# Patient Record
Sex: FEMALE | Race: WHITE
[De-identification: names, ages, dates, MRNs, and addresses within clinical notes are randomized per-mention and may not be internally consistent; named-entity substitution may affect disease eponyms.]

---

## 2017-06-01 ENCOUNTER — HOSPITAL ENCOUNTER (OUTPATIENT)
Dept: HOSPITAL 63 - DXRADRC | Age: 22
Discharge: HOME | End: 2017-06-01
Attending: PHYSICIAN ASSISTANT
Payer: COMMERCIAL

## 2017-06-01 DIAGNOSIS — R07.89: Primary | ICD-10-CM

## 2017-06-01 PROCEDURE — 71101 X-RAY EXAM UNILAT RIBS/CHEST: CPT

## 2017-06-01 NOTE — RAD
Indication chronic left-sided chest pain.



A frontal view of the chest was obtained as well as films targeted to left

ribs. The chest is compared to an examination January 4, 2016.



The heart and pulmonary vessels appear normal. The mediastinum has a normal

appearance. There is no focal consolidated pneumonia. No pleural fluid or

pneumothorax is seen.



Films targeted left ribs appear unremarkable.



IMPRESSION: No acute or significant finding in the chest.

                          Normal plain films left ribs.

## 2018-06-05 ENCOUNTER — HOSPITAL ENCOUNTER (OUTPATIENT)
Dept: HOSPITAL 63 - US | Age: 23
Discharge: HOME | End: 2018-06-05
Payer: COMMERCIAL

## 2018-06-05 DIAGNOSIS — Z3A.19: ICD-10-CM

## 2018-06-05 DIAGNOSIS — O26.842: ICD-10-CM

## 2018-06-05 PROCEDURE — 76805 OB US >/= 14 WKS SNGL FETUS: CPT

## 2018-06-05 NOTE — RAD
EXAM: Obstetric ultrasound.

 

HISTORY: Fetal anatomy survey.

 

COMPARISON: None.

 

FINDINGS: Sonographic evaluation of the pelvis and fetus was performed 

transabdominally.

 

There is a single fetus in breech presentation. Fetal heart rate is 139 

bpm. Estimated gestational age based on measurements is 19 weeks 5 days. 

Estimated fetal weight is 310 g, at 49th percentile. Individual 

measurements are commensurate.

 

The placenta is anterior. Amniotic fluid volume is normal. The cervix is 

closed and measures 4.4 cm.

 

The choroid plexus and lateral ventricles are unremarkable. The posterior 

fossa appears normal. Images of the spine reveal no clear defects. The 

stomach and bladder are visualized. Images of the kidneys reveal no 

hydronephrosis. Fetal extremities appear normal. The cord is three-vessel.

Facial and lip/nose morphology appear normal. The heart is four-chamber on

real-time scanning, but this is not well demonstrated on these images. The

cord insertion appears normal.

 

The left ovary measures 3.4 x 2.7 x 2.2 cm. The right measures 3.0 x 2.5 x

1.7 cm. Both are unremarkable.

 

IMPRESSION: 

1. Single fetus in breech presentation. Estimated gestational age based on

measurements 19 weeks 5 days. Fetal heart rate 139 bpm.

2. The heart is four-chamber on real-time scanning but this is not well 

demonstrated on these images. Attention on further follow-up.

 

Electronically signed by: BRUCE Garcia MD (6/5/2018 6:13 PM) [facility]

## 2021-12-02 ENCOUNTER — HOSPITAL ENCOUNTER (EMERGENCY)
Dept: HOSPITAL 63 - ER | Age: 26
Discharge: HOME | End: 2021-12-02
Payer: COMMERCIAL

## 2021-12-02 VITALS — BODY MASS INDEX: 20.85 KG/M2 | WEIGHT: 110.45 LBS | HEIGHT: 61 IN

## 2021-12-02 VITALS — DIASTOLIC BLOOD PRESSURE: 67 MMHG | SYSTOLIC BLOOD PRESSURE: 113 MMHG

## 2021-12-02 DIAGNOSIS — R07.89: Primary | ICD-10-CM

## 2021-12-02 DIAGNOSIS — Z98.890: ICD-10-CM

## 2021-12-02 LAB
ALBUMIN SERPL-MCNC: 4.1 G/DL (ref 3.4–5)
ALBUMIN/GLOB SERPL: 1.1 {RATIO} (ref 1–1.7)
ALP SERPL-CCNC: 59 U/L (ref 46–116)
ALT SERPL-CCNC: 29 U/L (ref 14–59)
AMPHETAMINE/METHAMPHETAMINE: (no result)
ANION GAP SERPL CALC-SCNC: 9 MMOL/L (ref 6–14)
APTT PPP: YELLOW S
AST SERPL-CCNC: 22 U/L (ref 15–37)
BACTERIA #/AREA URNS HPF: (no result) /HPF
BARBITURATES UR-MCNC: (no result) UG/ML
BASOPHILS # BLD AUTO: 0.1 X10^3/UL (ref 0–0.2)
BASOPHILS NFR BLD: 1 % (ref 0–3)
BENZODIAZ UR-MCNC: (no result) UG/L
BILIRUB SERPL-MCNC: 0.1 MG/DL (ref 0.2–1)
BILIRUB UR QL STRIP: (no result)
BUN/CREAT SERPL: 24 (ref 6–20)
CA-I SERPL ISE-MCNC: 17 MG/DL (ref 7–20)
CALCIUM SERPL-MCNC: 8.6 MG/DL (ref 8.5–10.1)
CANNABINOIDS UR-MCNC: (no result) UG/L
CHLORIDE SERPL-SCNC: 105 MMOL/L (ref 98–107)
CO2 SERPL-SCNC: 27 MMOL/L (ref 21–32)
COCAINE UR-MCNC: (no result) NG/ML
CREAT SERPL-MCNC: 0.7 MG/DL (ref 0.6–1)
EOSINOPHIL NFR BLD: 0.3 X10^3/UL (ref 0–0.7)
EOSINOPHIL NFR BLD: 4 % (ref 0–3)
ERYTHROCYTE [DISTWIDTH] IN BLOOD BY AUTOMATED COUNT: 14 % (ref 11.5–14.5)
FIBRINOGEN PPP-MCNC: CLEAR MG/DL
GFR SERPLBLD BASED ON 1.73 SQ M-ARVRAT: 102 ML/MIN
GLOBULIN SER-MCNC: 3.7 G/DL (ref 2.2–3.8)
GLUCOSE SERPL-MCNC: 90 MG/DL (ref 70–99)
GLUCOSE UR STRIP-MCNC: (no result) MG/DL
HCT VFR BLD CALC: 37.3 % (ref 36–47)
HGB BLD-MCNC: 12.5 G/DL (ref 12–15.5)
LYMPHOCYTES # BLD: 2.5 X10^3/UL (ref 1–4.8)
LYMPHOCYTES NFR BLD AUTO: 36 % (ref 24–48)
MCH RBC QN AUTO: 31 PG (ref 25–35)
MCHC RBC AUTO-ENTMCNC: 34 G/DL (ref 31–37)
MCV RBC AUTO: 92 FL (ref 79–100)
METHADONE SERPL-MCNC: (no result) NG/ML
MONO #: 0.3 X10^3/UL (ref 0–1.1)
MONOCYTES NFR BLD: 5 % (ref 0–9)
NEUT #: 3.8 X10^3UL (ref 1.8–7.7)
NEUTROPHILS NFR BLD AUTO: 54 % (ref 31–73)
NITRITE UR QL STRIP: (no result)
OPIATES UR-MCNC: (no result) NG/ML
PCP SERPL-MCNC: (no result) MG/DL
PLATELET # BLD AUTO: 354 X10^3/UL (ref 140–400)
POTASSIUM SERPL-SCNC: 4.3 MMOL/L (ref 3.5–5.1)
PROT SERPL-MCNC: 7.8 G/DL (ref 6.4–8.2)
RBC # BLD AUTO: 4.07 X10^6/UL (ref 3.5–5.4)
RBC #/AREA URNS HPF: 0 /HPF (ref 0–2)
SODIUM SERPL-SCNC: 141 MMOL/L (ref 136–145)
SP GR UR STRIP: 1.02
SQUAMOUS #/AREA URNS LPF: (no result) /LPF
U PREG PATIENT: NEGATIVE
UROBILINOGEN UR-MCNC: 0.2 MG/DL
WBC # BLD AUTO: 7.1 X10^3/UL (ref 4–11)
WBC #/AREA URNS HPF: 0 /HPF (ref 0–4)

## 2021-12-02 PROCEDURE — 36415 COLL VENOUS BLD VENIPUNCTURE: CPT

## 2021-12-02 PROCEDURE — 85025 COMPLETE CBC W/AUTO DIFF WBC: CPT

## 2021-12-02 PROCEDURE — 81001 URINALYSIS AUTO W/SCOPE: CPT

## 2021-12-02 PROCEDURE — 71045 X-RAY EXAM CHEST 1 VIEW: CPT

## 2021-12-02 PROCEDURE — 99285 EMERGENCY DEPT VISIT HI MDM: CPT

## 2021-12-02 PROCEDURE — 93005 ELECTROCARDIOGRAM TRACING: CPT

## 2021-12-02 PROCEDURE — 80053 COMPREHEN METABOLIC PANEL: CPT

## 2021-12-02 PROCEDURE — 81025 URINE PREGNANCY TEST: CPT

## 2021-12-02 PROCEDURE — 84484 ASSAY OF TROPONIN QUANT: CPT

## 2021-12-02 PROCEDURE — 80307 DRUG TEST PRSMV CHEM ANLYZR: CPT

## 2021-12-02 NOTE — PHYS DOC
Past History


Past Surgical History:  





General Adult


EDM:


Chief Complaint:  CHEST PAIN





HPI:


HPI:


26-year-old female presents with chest pain.  She has been having intermittent 

chest pain for the last 2 days.  She describes it as a dull ache just below her 

breasts with occasional sharp pains.  The sharp pain only last a few seconds.  

Dull ache is a 2 out of 10.  It comes and goes in waves seemingly at random.  

She denies shortness of breath or diaphoresis.  No cardiac history.  She denies 

fever or chills.  She does not have significant heartburn.  She does admit to 

stress having to children at home, 1 who is autistic.





Review of Systems:


Review of Systems:


Constitutional:  Denies fever or chills 


Eyes:  Denies change in visual acuity 


HENT:  Denies nasal congestion or sore throat 


Respiratory:  Denies cough or shortness of breath 


Cardiovascular: Chest pain


GI:  Denies abdominal pain, nausea, vomiting, bloody stools or diarrhea 


: Denies dysuria 


Musculoskeletal:  Denies back pain or joint pain 


Integument:  Denies rash 


Neurologic:  Denies headache, focal weakness or sensory changes 


Endocrine:  Denies polyuria or polydipsia 


Lymphatic:  Denies swollen glands 


Psychiatric:  Denies depression or anxiety





Allergies:


Allergies:





Allergies








Coded Allergies Type Severity Reaction Last Updated Verified


 


  No Known Drug Allergies    21 No











Physical Exam:


PE:





Constitutional: Well developed, well nourished, no acute distress, non-toxic 

appearance. []


HENT: Normocephalic, atraumatic, bilateral external ears normal, oropharynx 

moist, no oral exudates, nose normal. []


Eyes: PERRLA, EOMI, conjunctiva normal, no discharge. [] 


Neck: Normal range of motion, no tenderness, supple, no stridor. [] 


Cardiovascular: Heart rate regular rhythm, no murmur []


Lungs & Thorax:  Bilateral breath sounds clear to auscultation []


Abdomen: Bowel sounds normal, soft, no tenderness, no masses, no pulsatile 

masses. [] 


Skin: Warm, dry, no erythema, no rash. [] 


Back: No tenderness, no CVA tenderness. [] 


Extremities: No tenderness, no cyanosis, no clubbing, ROM intact, no edema. [] 


Neurologic: Alert and oriented X 3, normal motor function, normal sensory 

function, no focal deficits noted. []


Psychologic: Affect normal, judgement normal, mood concerned. []





Current Patient Data:


Labs:





                                Laboratory Tests








Test


 21


20:51 21


20:58


 


Urine Collection Type Unknown   


 


Urine Color Yellow   


 


Urine Clarity Clear   


 


Urine pH 7.0   


 


Urine Specific Gravity 1.025   


 


Urine Protein


 Neg


(NEG-TRACE) 





 


Urine Glucose (UA)


 Neg mg/dL


(NEG) 





 


Urine Ketones (Stick)


 Neg mg/dL


(NEG) 





 


Urine Blood Neg (NEG)   


 


Urine Nitrite Neg (NEG)   


 


Urine Bilirubin Neg (NEG)   


 


Urine Urobilinogen Dipstick


 0.2 mg/dL (0.2


mg/dL) 





 


Urine Leukocyte Esterase Neg (NEG)   


 


Urine RBC 0 /HPF (0-2)   


 


Urine WBC 0 /HPF (0-4)   


 


Urine Squamous Epithelial


Cells Few /LPF  


 





 


Urine Bacteria


 Few /HPF


(0-FEW) 





 


Urine Pregnancy Test


 Negative (NEG)


 





 


White Blood Count


 


 7.1 x10^3/uL


(4.0-11.0)


 


Red Blood Count


 


 4.07 x10^6/uL


(3.50-5.40)


 


Hemoglobin


 


 12.5 g/dL


(12.0-15.5)


 


Hematocrit


 


 37.3 %


(36.0-47.0)


 


Mean Corpuscular Volume


 


 92 fL ()





 


Mean Corpuscular Hemoglobin  31 pg (25-35)  


 


Mean Corpuscular Hemoglobin


Concent 


 34 g/dL


(31-37)


 


Red Cell Distribution Width


 


 14.0 %


(11.5-14.5)


 


Platelet Count


 


 354 x10^3/uL


(140-400)


 


Neutrophils (%) (Auto)  54 % (31-73)  


 


Lymphocytes (%) (Auto)  36 % (24-48)  


 


Monocytes (%) (Auto)  5 % (0-9)  


 


Eosinophils (%) (Auto)  4 % (0-3)  H


 


Basophils (%) (Auto)  1 % (0-3)  


 


Neutrophils # (Auto)


 


 3.8 x10^3uL


(1.8-7.7)


 


Lymphocytes # (Auto)


 


 2.5 x10^3/uL


(1.0-4.8)


 


Monocytes # (Auto)


 


 0.3 x10^3/uL


(0.0-1.1)


 


Eosinophils # (Auto)


 


 0.3 x10^3/uL


(0.0-0.7)


 


Basophils # (Auto)


 


 0.1 x10^3/uL


(0.0-0.2)








Vital Signs:





                                   Vital Signs








  Date Time  Temp Pulse Resp B/P (MAP) Pulse Ox O2 Delivery O2 Flow Rate FiO2


 


21 21:02  86 16 123/63 (83) 100 Room Air  


 


21 20:10 98.1       











EKG:


EKG:


Sinus rhythm, rate 91, normal axis, no ST elevation or depression.  []





Radiology/Procedures:


Radiology/Procedures:


[]


Impressions:


Exam: Chest one view





INDICATION: Chest pain





TECHNIQUE: Frontal view of the chest





Comparisons: 2017





FINDINGS:


The cardiomediastinal silhouette and pulmonary vessels are within normal limits.





The lung and pleural spaces are clear.





IMPRESSION:


No acute cardiopulmonary process.





Electronically signed by: Anna Kendrick MD (2021 9:45 PM) [facility]














DICTATED AND SIGNED BY:     ANNA KENDRICK MD


DATE:     21





CC: VICKI SCOTT DO; SAMUEL BARCLAY MD ~MTH0 0





Heart Score:


C/O Chest Pain:  Yes


HEART Score for Chest Pain:  








HEART Score for Chest Pain Response (Comments) Value


 


History Slighlty/Non-Suspicious 0


 


ECG Normal 0


 


Age < 45 0


 


Risk Factors No Risk Factors 0


 


Troponin < Normal Limit 0


 


Total  0








Risk Factors:


Risk Factors:  DM, Current or recent (<one month) smoker, HTN, HLP, family 

history of CAD, obesity.


Risk Scores:


Score 0 - 3:  2.5% MACE over next 6 weeks - Discharge Home


Score 4 - 6:  20.3% MACE over next 6 weeks - Admit for Clinical Observation


Score 7 - 10:  72.7% MACE over next 6 weeks - Early Invasive Strategies





Course & Med Decision Making:


Course & Med Decision Making


Pertinent Labs and Imaging studies reviewed. (See chart for details)


The patient does not have tenderness palpation of the rib cage or the chest.  

The patient's labs are unremarkable.  Her troponin is negative.  Her chest x-ray

 is negative for acute findings.  This could be musculoskeletal pain or chest 

stress.  I do not see any concerning cardiopulmonary findings.  Her heart score 

is a 0.  She is stable for discharge at this time.


[]





Dragon Disclaimer:


Dragon Disclaimer:


This electronic medical record was generated, in whole or in part, using a voice

 recognition dictation system.





Departure


Departure:


Impression:  


   Primary Impression:  


   Chest pain


   Qualified Codes:  R07.9 - Chest pain, unspecified


Disposition:  01 HOME / SELF CARE / HOMELESS


Condition:  STABLE


Referrals:  


SAMUEL BARCLAY MD (PCP)


Patient Instructions:  Chest Pain (Nonspecific), Easy-to-Read











VICKI SCOTT DO                  Dec 2, 2021 21:40

## 2021-12-02 NOTE — RAD
Exam: Chest one view



INDICATION: Chest pain



TECHNIQUE: Frontal view of the chest



Comparisons: 6/1/2017



FINDINGS:

The cardiomediastinal silhouette and pulmonary vessels are within normal limits.



The lung and pleural spaces are clear.



IMPRESSION:

No acute cardiopulmonary process.



Electronically signed by: Vivek Randolph MD (12/2/2021 9:45 PM) [facility]

## 2021-12-03 NOTE — EKG
[facility] [address], [place] [address]

Test Date:    2021               Test Time:    20:14:12

Pat Name:     RUT VAUGHN           Department:   

Patient ID:   [id number]           Room:          

Gender:       F                        Technician:   

:          [id number]               Requested By: VICKI SCOTT

Order Number: [id number]            Reading MD:   Reji Welch MD

                                 Measurements

Intervals                              Axis          

Rate:         91                       P:            31

WA:           148                      QRS:          27

QRSD:         66                       T:            41

QT:           322                                    

QTc:          398                                    

                           Interpretive Statements

SINUS RHYTHM

Electronically Signed On 2021 20:55:11 CST by Reji Welch MD